# Patient Record
Sex: FEMALE | Race: WHITE | NOT HISPANIC OR LATINO | URBAN - METROPOLITAN AREA
[De-identification: names, ages, dates, MRNs, and addresses within clinical notes are randomized per-mention and may not be internally consistent; named-entity substitution may affect disease eponyms.]

---

## 2018-10-15 ENCOUNTER — OUTPATIENT (OUTPATIENT)
Dept: OUTPATIENT SERVICES | Facility: HOSPITAL | Age: 18
LOS: 1 days | Discharge: HOME | End: 2018-10-15

## 2018-10-15 VITALS — HEART RATE: 86 BPM | DIASTOLIC BLOOD PRESSURE: 77 MMHG | SYSTOLIC BLOOD PRESSURE: 122 MMHG

## 2018-10-15 VITALS
DIASTOLIC BLOOD PRESSURE: 77 MMHG | HEART RATE: 86 BPM | TEMPERATURE: 99 F | SYSTOLIC BLOOD PRESSURE: 122 MMHG | RESPIRATION RATE: 18 BRPM

## 2018-10-15 RX ORDER — ACETAMINOPHEN 500 MG
650 TABLET ORAL ONCE
Qty: 0 | Refills: 0 | Status: COMPLETED | OUTPATIENT
Start: 2018-10-15 | End: 2018-10-15

## 2018-10-15 RX ORDER — SODIUM CHLORIDE 9 MG/ML
1000 INJECTION, SOLUTION INTRAVENOUS
Qty: 0 | Refills: 0 | Status: DISCONTINUED | OUTPATIENT
Start: 2018-10-15 | End: 2018-10-30

## 2018-10-15 NOTE — OB PROVIDER TRIAGE NOTE - NSHPLABSRESULTS_GEN_ALL_CORE
Hep B (03/15/2018): nonreactive  Chlamydia (03/15/2018): neg  Gonorrhea (03/15/2018): neg  Measles (03/15/2018): immune  Varicella (03/15/2018): immune  Rubella (03/15/2018): immune  Mumps (03/15/2018): immune  T&S (03/15/2018): A pos  Antibody screen (03/15/2018): neg  RPR (03/15/2018): neg  Parvo (03/15/2018): immune  GCT: 73  Sono:   11w1d: SVIUP, CRL 11.5w, right ovary 4.2 cm cyst, simple, left ovary normal,   20w2d: AGA 78%, AFL nl, posterior placenta, CL 4.1cm, no anomalies  30w3d: BPP 8/8, MVP 5cm GBS (09/27/2018): pos  HIV (09/27/2018): neg  Hep B (03/15/2018): nonreactive  Chlamydia (03/15/2018): neg  Gonorrhea (03/15/2018): neg  Measles (03/15/2018): immune  Varicella (03/15/2018): immune  Rubella (03/15/2018): immune  Mumps (03/15/2018): immune  T&S (03/15/2018): A pos  Antibody screen (03/15/2018): neg  RPR (03/15/2018): neg  Parvo (03/15/2018): immune  GCT: 73  Sono:   11w1d: SVIUP, CRL 11.5w, right ovary 4.2 cm cyst, simple, left ovary normal,   20w2d: AGA 78%, AFL nl, posterior placenta, CL 4.1cm, no anomalies  30w3d: BPP 8/8, MVP 5cm

## 2018-10-15 NOTE — OB PROVIDER TRIAGE NOTE - NSHPPHYSICALEXAM_GEN_ALL_CORE
Vital Signs Last 24 Hrs  T(C): 37 (15 Oct 2018 21:51), Max: 37 (15 Oct 2018 21:51)  T(F): 98.6 (15 Oct 2018 21:51), Max: 98.6 (15 Oct 2018 21:51)  HR: 86 (15 Oct 2018 21:54) (86 - 86)  BP: 122/77 (15 Oct 2018 21:54) (122/77 - 122/77)  RR: 18 (15 Oct 2018 21:51) (18 - 18)    Udip:   EFM: 135/mod/accel+  [place]: irritability   Abd: NT, gravid, no palpable contractions  EFW by Leopold's: 3700 grams Vital Signs Last 24 Hrs  T(C): 37 (15 Oct 2018 21:51), Max: 37 (15 Oct 2018 21:51)  T(F): 98.6 (15 Oct 2018 21:51), Max: 98.6 (15 Oct 2018 21:51)  HR: 86 (15 Oct 2018 21:54) (86 - 86)  BP: 122/77 (15 Oct 2018 21:54) (122/77 - 122/77)  RR: 18 (15 Oct 2018 21:51) (18 - 18)    EFM: 135/mod/accel+  [place]: irritability   Abd: NT, gravid, no palpable contractions  EFW by Leopold's: 3700 grams Vital Signs Last 24 Hrs  T(C): 37 (15 Oct 2018 21:51), Max: 37 (15 Oct 2018 21:51)  T(F): 98.6 (15 Oct 2018 21:51), Max: 98.6 (15 Oct 2018 21:51)  HR: 86 (15 Oct 2018 21:54) (86 - 86)  BP: 122/77 (15 Oct 2018 21:54) (122/77 - 122/77)  RR: 18 (15 Oct 2018 21:51) (18 - 18)    EFM: 135/mod/accel+  [place]: irritability   Abd: NT, gravid, no palpable contractions, no CVA tenderness  EFW by Leopold's: 3700 grams Vital Signs Last 24 Hrs  T(C): 37 (15 Oct 2018 21:51), Max: 37 (15 Oct 2018 21:51)  T(F): 98.6 (15 Oct 2018 21:51), Max: 98.6 (15 Oct 2018 21:51)  HR: 86 (15 Oct 2018 21:54) (86 - 86)  BP: 122/77 (15 Oct 2018 21:54) (122/77 - 122/77)  RR: 18 (15 Oct 2018 21:51) (18 - 18)    EFM: 135/mod/accel+  [place]: irregular  Abd: NT, gravid, no palpable contractions, no CVA tenderness  EFW by Leopold's: 3700 grams Vital Signs Last 24 Hrs  T(C): 37 (15 Oct 2018 21:51), Max: 37 (15 Oct 2018 21:51)  T(F): 98.6 (15 Oct 2018 21:51), Max: 98.6 (15 Oct 2018 21:51)  HR: 86 (15 Oct 2018 21:54) (86 - 86)  BP: 122/77 (15 Oct 2018 21:54) (122/77 - 122/77)  RR: 18 (15 Oct 2018 21:51) (18 - 18)    Udip: large ketones  EFM: 135/mod/accel+  [place]: irregular  Abd: NT, gravid, no palpable contractions, no CVA tenderness  EFW by Leopold's: 3700 grams Vital Signs Last 24 Hrs  T(C): 37 (15 Oct 2018 21:51), Max: 37 (15 Oct 2018 21:51)  T(F): 98.6 (15 Oct 2018 21:51), Max: 98.6 (15 Oct 2018 21:51)  HR: 86 (15 Oct 2018 21:54) (86 - 86)  BP: 122/77 (15 Oct 2018 21:54) (122/77 - 122/77)  RR: 18 (15 Oct 2018 21:51) (18 - 18)    Udip: large ketones  EFM: 135/mod/accel+  [place]: irregular  Abd: NT, gravid, no palpable contractions, no CVA tenderness  EFW by Leopold's: 3700 grams  Bedside sono: lateral placenta, cephalic, BPP 8/8, MVP 3.11 cm Vital Signs Last 24 Hrs  T(C): 37 (15 Oct 2018 21:51), Max: 37 (15 Oct 2018 21:51)  T(F): 98.6 (15 Oct 2018 21:51), Max: 98.6 (15 Oct 2018 21:51)  HR: 86 (15 Oct 2018 21:54) (86 - 86)  BP: 122/77 (15 Oct 2018 21:54) (122/77 - 122/77)  RR: 18 (15 Oct 2018 21:51) (18 - 18)    Udip: large ketones  EFM: 135/mod/accel+  [place]: irregular  Abd: NT, gravid, no palpable contractions, no CVA tenderness  EFW by Leopold's: 3700 grams  Bedside sono: lateral placenta, cephalic, BPP 8/8, MVP 3.30 cm Vital Signs Last 24 Hrs  T(C): 37 (15 Oct 2018 21:51), Max: 37 (15 Oct 2018 21:51)  T(F): 98.6 (15 Oct 2018 21:51), Max: 98.6 (15 Oct 2018 21:51)  HR: 86 (15 Oct 2018 21:54) (86 - 86)  BP: 122/77 (15 Oct 2018 21:54) (122/77 - 122/77)  RR: 18 (15 Oct 2018 21:51) (18 - 18)    Udip: large ketones  EFM: 135/mod/accel+  [place]: irregular  SVE: C/L/P  Abd: NT, gravid, no palpable contractions, no CVA tenderness  EFW by Leopold's: 3700 grams  Bedside sono: lateral placenta, cephalic, BPP 8/8, MVP 3.30 cm

## 2018-10-15 NOTE — OB PROVIDER TRIAGE NOTE - NSOBPROVIDERNOTE_OBGYN_ALL_OB_FT
19 yo  at 38w1d, GBS pos,     Dr. Dyer and Dr. Lang to be made aware. 19 yo  at 38w1d, GBS pos, for hydration and observation.     -IVF  -CBC, BMP, liver function panel, lipase and amylase  -UA and urine culture  -Continuous EFM/toco  -Vital signs per routine  -Tylenol 650 mg    Dr. Dyer and Dr. Lang aware. 17 yo  at 38w1d, GBS pos, for hydration and observation.     -IVF  -CBC, BMP, liver function panel, lipase and amylase  -UA and urine culture  -Continuous EFM/toco  -Vital signs per routine  -Tylenol 650 mg    Dr. Dyer and Dr. Lang aware.    ADDENDUM  Pt observed, all labs normal except for WBC count. WBC count went down on the second CBC, RUQ US showed nothing significant. Pt given labor precautions, fetal kick counts reinforced. Ambulation and PO hydration encouraged. Pt will be seen in the office on Wednesday (10/17).     Dr. Dyer and Dr. Lang aware. 17 yo  at 38w1d, GBS pos, for hydration and observation.     -IVF  -CBC, BMP, liver function panel, lipase and amylase  -UA and urine culture  -Continuous EFM/toco  -Vital signs per routine  -Tylenol 650 mg    Dr. Dyer and Dr. Lang aware.  Ob Attg- Pt seen and examined at bedside.   Pt is a 17 yo  at 38w1d w/ PAOLA of 10/28/2018 by 1st trimester sonogram presented w/ lower back pain more on the right side that started at 1300, moved to her epigastric area and back to her back, 7-8/10 intensity, constant and changes with positional changes. Tylenol did not help. Denies fever, dysuria, frequency, urgency.  diarrhea x 2 , some nausea but no vomiting. Pain does not change with eating. Also reports feeling shaky in the hands, feeling cold. No contractions, LOF and VB. good FM. Seen in office today and cervix was closed. No complications in this pregnancy, small R ovarain cyst seen early pregnancy , 4cm. On rpt sono at level 2, R ov not seen due to bowel gas.     OB Hx: primigravid  GYN Hx: denies h/o fibroids and STIs, h/o right sided ovarian cyst in this pregnancy   SH: denies tobacco, alcohol and illicit drug use  Med: none  Allergies: NKDA     On exam Vss, afebrile   abd soft nt, gravid term  ext nt  no cvat  Fhr category 1 ,irreg ctx not felt by patient    Imp-Term pregnancy, back pain at edge of ribs posteriorly. No evidence of pyelo, completely benign exam.    ADDENDUM  Pt observed, all labs normal except for WBC count. WBC count went down on the second CBC, RUQ US showed nothing significant. Pt given labor precautions, fetal kick counts reinforced. Ambulation and PO hydration encouraged. Pt will be seen in the office on Wednesday (10/17).     Dr. Dyer and Dr. Lang aware. 17 yo  at 38w1d, GBS pos, for hydration and observation.     -IVF  -CBC, BMP, liver function panel, lipase and amylase  -UA and urine culture  -Continuous EFM/toco  -Vital signs per routine  -Tylenol 650 mg    Dr. Dyer and Dr. Lang aware.    Ob Attg- Pt seen and examined at bedside.   Pt is a 17 yo  at 38w1d w/ PAOLA of 10/28/2018 by 1st trimester sonogram presented w/ lower back pain more on the right side that started at 1300, moved to her epigastric area and back to her back, 7-8/10 intensity, constant and changes with positional changes. Tylenol did not help. Denies fever, dysuria, frequency, urgency.  diarrhea x 2 , some nausea but no vomiting. Pain does not change with eating. Also reports feeling shaky in the hands, feeling cold. No contractions, LOF and VB. good FM. Seen in office today and cervix was closed. No complications in this pregnancy, small R ovarain cyst seen early pregnancy , 4cm. On rpt sono at level 2, R ov not seen due to bowel gas.     OB Hx: primigravid  GYN Hx: denies h/o fibroids and STIs, h/o right sided ovarian cyst in this pregnancy   SH: denies tobacco, alcohol and illicit drug use  Med: none  Allergies: NKDA     On exam Vss, afebrile   abd soft nt, gravid term  ext nt  no cvat  Fhr category 1 ,irreg ctx not felt by patient  labs: 18.7>11.8/36.3, then rpt 16.7>11.3/34.1, abd and Ruq sono nl, u/a +ketones otherwise negative , Cmp Nl, amylase and lipase nl.     Imp-Term pregnancy, back pain at edge of ribs posteriorly. No evidence of pyelo, completely benign exam.  Pt observed, all labs normal except for WBC count. WBC count went down on the second CBC, RUQ US showed nothing significant. On exam mildly tender over ribs posteriorly, picture c/w musculoskeletal pain.   Plan-Precautions rev, if fever, vomiting or worse pain, ret to l&D, labor precautions  will f/u in office 36hrs.     Dr. Dyer and Dr. Lang aware.

## 2018-10-15 NOTE — OB RN PATIENT PROFILE - NS_EDDCALCULATED_OBGYN_ALL_OB
Mom arrived to bedside to visit pt at 2247 with her adult son. Mom updated on pt's status, changes made, and plan of care. All questions answered. Mom held pt's hand and asked appropriate questions. Mom left bedside at 2324 and stated that she was going to try and set up a ride with the medicaid bus service to visit patient. DCFS  name and phone number notepaper given to mom as requested.   First Trimester Sonogram

## 2018-10-15 NOTE — OB PROVIDER TRIAGE NOTE - FAMILY HISTORY
Sibling  Still living? Unknown  Family history of asthma, Age at diagnosis: Age Unknown     Mother  Still living? Unknown  Family history of hypertension, Age at diagnosis: Age Unknown

## 2018-10-15 NOTE — OB PROVIDER TRIAGE NOTE - HISTORY OF PRESENT ILLNESS
19 yo  at 38w1d w/ PAOLA of 10/28/2018 by 1st trimester sonogram here for back pain that started at 1300, moved to her epigastric area and back to her back, 7-8/10 intensity, constant and changes position with positional changes. Tylenol did not help. Also reports feeling shaky in the hands, feeling cold. Denies contractions, LOF and VB. Feels good FM. Last PMD visit was today, VE was done and her cervix was closed. No complications in this pregnancy     OB Hx: primigravid  GYN Hx: denies h/o fibroids, ovarian cysts and STIs  SH: denies tobacco, alcohol and illicit drug use  Med: none  Allergies: NKDA 19 yo  at 38w1d w/ PAOLA of 10/28/2018 by 1st trimester sonogram here for back pain more on the right side that started at 1300, moved to her epigastric area and back to her back, 7-8/10 intensity, constant and changes position with positional changes. Tylenol did not help. Also reports feeling shaky in the hands, feeling cold. Denies contractions, LOF and VB. Feels good FM. Last PMD visit was today, VE was done and her cervix was closed. No complications in this pregnancy     OB Hx: primigravid  GYN Hx: denies h/o fibroids and STIs, h/o right sided ovarian cyst in this pregnancy   SH: denies tobacco, alcohol and illicit drug use  Med: none  Allergies: NKDA 19 yo  at 38w1d w/ PAOLA of 10/28/2018 by 1st trimester sonogram here for back pain more on the right side that started at 1300, moved to her epigastric area and back to her back, 7-8/10 intensity, constant and changes position with positional changes. Tylenol did not help. Denies fever, dysuria, frequency, urgency. Reports two episodes of diarrhea since the morning, some nausea but no vomiting. Pain does not change with eating. Also reports feeling shaky in the hands, feeling cold. Denies contractions, LOF and VB. Feels good FM. Last PMD visit was today, VE was done and her cervix was closed. No complications in this pregnancy     OB Hx: primigravid  GYN Hx: denies h/o fibroids and STIs, h/o right sided ovarian cyst in this pregnancy   SH: denies tobacco, alcohol and illicit drug use  Med: none  Allergies: NKDA 17 yo  at 38w1d w/ PAOLA of 10/28/2018 by 1st trimester sonogram here for back pain more on the right side that started at 1300, moved to her epigastric area and back to her back, 7-8/10 intensity, constant and changes with positional changes. Tylenol did not help. Denies fever, dysuria, frequency, urgency. Reports two episodes of diarrhea since the morning, some nausea but no vomiting. Pain does not change with eating. Also reports feeling shaky in the hands, feeling cold. Denies contractions, LOF and VB. Feels good FM. Last PMD visit was today, VE was done and her cervix was closed. No complications in this pregnancy     OB Hx: primigravid  GYN Hx: denies h/o fibroids and STIs, h/o right sided ovarian cyst in this pregnancy   SH: denies tobacco, alcohol and illicit drug use  Med: none  Allergies: NKDA

## 2018-10-16 ENCOUNTER — OUTPATIENT (OUTPATIENT)
Dept: OUTPATIENT SERVICES | Facility: HOSPITAL | Age: 18
LOS: 1 days | Discharge: HOME | End: 2018-10-16
Payer: MEDICAID

## 2018-10-16 VITALS — TEMPERATURE: 99 F | RESPIRATION RATE: 20 BRPM

## 2018-10-16 VITALS — DIASTOLIC BLOOD PRESSURE: 74 MMHG | HEART RATE: 65 BPM | SYSTOLIC BLOOD PRESSURE: 120 MMHG

## 2018-10-16 LAB
ALBUMIN SERPL ELPH-MCNC: 3.8 G/DL — SIGNIFICANT CHANGE UP (ref 3.5–5.2)
ALBUMIN SERPL ELPH-MCNC: 3.8 G/DL — SIGNIFICANT CHANGE UP (ref 3.5–5.2)
ALP SERPL-CCNC: 154 U/L — HIGH (ref 30–115)
ALP SERPL-CCNC: 155 U/L — HIGH (ref 30–115)
ALT FLD-CCNC: 13 U/L — LOW (ref 14–37)
ALT FLD-CCNC: 14 U/L — SIGNIFICANT CHANGE UP (ref 14–37)
AMYLASE P1 CFR SERPL: 48 U/L — SIGNIFICANT CHANGE UP (ref 25–115)
AMYLASE P1 CFR SERPL: 60 U/L — SIGNIFICANT CHANGE UP (ref 25–115)
ANION GAP SERPL CALC-SCNC: 19 MMOL/L — HIGH (ref 7–14)
ANION GAP SERPL CALC-SCNC: 20 MMOL/L — HIGH (ref 7–14)
APPEARANCE UR: CLEAR — SIGNIFICANT CHANGE UP
APPEARANCE UR: CLEAR — SIGNIFICANT CHANGE UP
AST SERPL-CCNC: 21 U/L — SIGNIFICANT CHANGE UP (ref 14–37)
AST SERPL-CCNC: 25 U/L — SIGNIFICANT CHANGE UP (ref 14–37)
BASOPHILS # BLD AUTO: 0.04 K/UL — SIGNIFICANT CHANGE UP (ref 0–0.2)
BASOPHILS NFR BLD AUTO: 0.2 % — SIGNIFICANT CHANGE UP (ref 0–1)
BILIRUB DIRECT SERPL-MCNC: <0.2 MG/DL — SIGNIFICANT CHANGE UP (ref 0–0.2)
BILIRUB INDIRECT FLD-MCNC: >0.2 MG/DL — SIGNIFICANT CHANGE UP (ref 0.2–1.2)
BILIRUB SERPL-MCNC: 0.4 MG/DL — SIGNIFICANT CHANGE UP (ref 0.2–1.2)
BILIRUB SERPL-MCNC: 0.7 MG/DL — SIGNIFICANT CHANGE UP (ref 0.2–1.2)
BILIRUB UR-MCNC: NEGATIVE — SIGNIFICANT CHANGE UP
BILIRUB UR-MCNC: NEGATIVE — SIGNIFICANT CHANGE UP
BUN SERPL-MCNC: 4 MG/DL — LOW (ref 10–20)
BUN SERPL-MCNC: 4 MG/DL — LOW (ref 10–20)
CALCIUM SERPL-MCNC: 9.2 MG/DL — SIGNIFICANT CHANGE UP (ref 8.5–10.1)
CALCIUM SERPL-MCNC: 9.8 MG/DL — SIGNIFICANT CHANGE UP (ref 8.5–10.1)
CHLORIDE SERPL-SCNC: 101 MMOL/L — SIGNIFICANT CHANGE UP (ref 98–110)
CHLORIDE SERPL-SCNC: 98 MMOL/L — SIGNIFICANT CHANGE UP (ref 98–110)
CO2 SERPL-SCNC: 16 MMOL/L — LOW (ref 17–32)
CO2 SERPL-SCNC: 20 MMOL/L — SIGNIFICANT CHANGE UP (ref 17–32)
COLOR SPEC: YELLOW — SIGNIFICANT CHANGE UP
COLOR SPEC: YELLOW — SIGNIFICANT CHANGE UP
CREAT SERPL-MCNC: 0.5 MG/DL — LOW (ref 0.7–1.5)
CREAT SERPL-MCNC: 0.5 MG/DL — LOW (ref 0.7–1.5)
DIFF PNL FLD: NEGATIVE — SIGNIFICANT CHANGE UP
DIFF PNL FLD: NEGATIVE — SIGNIFICANT CHANGE UP
EOSINOPHIL # BLD AUTO: 0 K/UL — SIGNIFICANT CHANGE UP (ref 0–0.7)
EOSINOPHIL NFR BLD AUTO: 0 % — SIGNIFICANT CHANGE UP (ref 0–8)
GLUCOSE SERPL-MCNC: 79 MG/DL — SIGNIFICANT CHANGE UP (ref 70–99)
GLUCOSE SERPL-MCNC: 90 MG/DL — SIGNIFICANT CHANGE UP (ref 70–99)
GLUCOSE UR QL: NEGATIVE MG/DL — SIGNIFICANT CHANGE UP
GLUCOSE UR QL: NEGATIVE MG/DL — SIGNIFICANT CHANGE UP
HCT VFR BLD CALC: 34.1 % — LOW (ref 37–47)
HCT VFR BLD CALC: 36.2 % — LOW (ref 37–47)
HCT VFR BLD CALC: 36.3 % — LOW (ref 37–47)
HGB BLD-MCNC: 11.3 G/DL — LOW (ref 12–16)
HGB BLD-MCNC: 11.8 G/DL — LOW (ref 12–16)
HGB BLD-MCNC: 11.9 G/DL — LOW (ref 12–16)
IMM GRANULOCYTES NFR BLD AUTO: 0.9 % — HIGH (ref 0.1–0.3)
KETONES UR-MCNC: 15
KETONES UR-MCNC: ABNORMAL
LEUKOCYTE ESTERASE UR-ACNC: NEGATIVE — SIGNIFICANT CHANGE UP
LEUKOCYTE ESTERASE UR-ACNC: NEGATIVE — SIGNIFICANT CHANGE UP
LIDOCAIN IGE QN: 16 U/L — SIGNIFICANT CHANGE UP (ref 7–60)
LIDOCAIN IGE QN: 18 U/L — SIGNIFICANT CHANGE UP (ref 7–60)
LYMPHOCYTES # BLD AUTO: 1.01 K/UL — LOW (ref 1.2–3.4)
LYMPHOCYTES # BLD AUTO: 6.2 % — LOW (ref 20.5–51.1)
MCHC RBC-ENTMCNC: 26.4 PG — LOW (ref 27–31)
MCHC RBC-ENTMCNC: 26.9 PG — LOW (ref 27–31)
MCHC RBC-ENTMCNC: 26.9 PG — LOW (ref 27–31)
MCHC RBC-ENTMCNC: 32.5 G/DL — SIGNIFICANT CHANGE UP (ref 32–37)
MCHC RBC-ENTMCNC: 32.9 G/DL — SIGNIFICANT CHANGE UP (ref 32–37)
MCHC RBC-ENTMCNC: 33.1 G/DL — SIGNIFICANT CHANGE UP (ref 32–37)
MCV RBC AUTO: 81.2 FL — SIGNIFICANT CHANGE UP (ref 81–99)
MCV RBC AUTO: 81.2 FL — SIGNIFICANT CHANGE UP (ref 81–99)
MCV RBC AUTO: 81.9 FL — SIGNIFICANT CHANGE UP (ref 81–99)
MONOCYTES # BLD AUTO: 1.15 K/UL — HIGH (ref 0.1–0.6)
MONOCYTES NFR BLD AUTO: 7 % — SIGNIFICANT CHANGE UP (ref 1.7–9.3)
NEUTROPHILS # BLD AUTO: 14.03 K/UL — HIGH (ref 1.4–6.5)
NEUTROPHILS NFR BLD AUTO: 85.7 % — HIGH (ref 42.2–75.2)
NITRITE UR-MCNC: NEGATIVE — SIGNIFICANT CHANGE UP
NITRITE UR-MCNC: NEGATIVE — SIGNIFICANT CHANGE UP
NRBC # BLD: 0 /100 WBCS — SIGNIFICANT CHANGE UP (ref 0–0)
PH UR: 6 — SIGNIFICANT CHANGE UP (ref 5–8)
PH UR: 7 — SIGNIFICANT CHANGE UP (ref 5–8)
PLATELET # BLD AUTO: 213 K/UL — SIGNIFICANT CHANGE UP (ref 130–400)
PLATELET # BLD AUTO: 232 K/UL — SIGNIFICANT CHANGE UP (ref 130–400)
PLATELET # BLD AUTO: 233 K/UL — SIGNIFICANT CHANGE UP (ref 130–400)
POTASSIUM SERPL-MCNC: 4 MMOL/L — SIGNIFICANT CHANGE UP (ref 3.5–5)
POTASSIUM SERPL-MCNC: 4.4 MMOL/L — SIGNIFICANT CHANGE UP (ref 3.5–5)
POTASSIUM SERPL-SCNC: 4 MMOL/L — SIGNIFICANT CHANGE UP (ref 3.5–5)
POTASSIUM SERPL-SCNC: 4.4 MMOL/L — SIGNIFICANT CHANGE UP (ref 3.5–5)
PROT SERPL-MCNC: 6.5 G/DL — SIGNIFICANT CHANGE UP (ref 6–8)
PROT SERPL-MCNC: 6.6 G/DL — SIGNIFICANT CHANGE UP (ref 6–8)
PROT UR-MCNC: NEGATIVE MG/DL — SIGNIFICANT CHANGE UP
PROT UR-MCNC: NEGATIVE MG/DL — SIGNIFICANT CHANGE UP
RBC # BLD: 4.2 M/UL — SIGNIFICANT CHANGE UP (ref 4.2–5.4)
RBC # BLD: 4.42 M/UL — SIGNIFICANT CHANGE UP (ref 4.2–5.4)
RBC # BLD: 4.47 M/UL — SIGNIFICANT CHANGE UP (ref 4.2–5.4)
RBC # FLD: 13.4 % — SIGNIFICANT CHANGE UP (ref 11.5–14.5)
RBC # FLD: 13.5 % — SIGNIFICANT CHANGE UP (ref 11.5–14.5)
RBC # FLD: 13.7 % — SIGNIFICANT CHANGE UP (ref 11.5–14.5)
SODIUM SERPL-SCNC: 137 MMOL/L — SIGNIFICANT CHANGE UP (ref 135–146)
SODIUM SERPL-SCNC: 137 MMOL/L — SIGNIFICANT CHANGE UP (ref 135–146)
SP GR SPEC: 1.01 — SIGNIFICANT CHANGE UP (ref 1.01–1.03)
SP GR SPEC: <=1.005 — SIGNIFICANT CHANGE UP (ref 1.01–1.03)
UROBILINOGEN FLD QL: 0.2 MG/DL — SIGNIFICANT CHANGE UP (ref 0.2–0.2)
UROBILINOGEN FLD QL: 0.2 MG/DL — SIGNIFICANT CHANGE UP (ref 0.2–0.2)
WBC # BLD: 16.38 K/UL — HIGH (ref 4.8–10.8)
WBC # BLD: 16.68 K/UL — HIGH (ref 4.8–10.8)
WBC # BLD: 18.74 K/UL — HIGH (ref 4.8–10.8)
WBC # FLD AUTO: 16.38 K/UL — HIGH (ref 4.8–10.8)
WBC # FLD AUTO: 16.68 K/UL — HIGH (ref 4.8–10.8)
WBC # FLD AUTO: 18.74 K/UL — HIGH (ref 4.8–10.8)

## 2018-10-16 PROCEDURE — 59025 FETAL NON-STRESS TEST: CPT | Mod: 26

## 2018-10-16 PROCEDURE — 93970 EXTREMITY STUDY: CPT | Mod: 26

## 2018-10-16 PROCEDURE — 99221 1ST HOSP IP/OBS SF/LOW 40: CPT | Mod: 25

## 2018-10-16 RX ORDER — OXYCODONE AND ACETAMINOPHEN 5; 325 MG/1; MG/1
1 TABLET ORAL ONCE
Qty: 0 | Refills: 0 | Status: DISCONTINUED | OUTPATIENT
Start: 2018-10-16 | End: 2018-10-16

## 2018-10-16 RX ORDER — FAMOTIDINE 10 MG/ML
20 INJECTION INTRAVENOUS ONCE
Qty: 0 | Refills: 0 | Status: DISCONTINUED | OUTPATIENT
Start: 2018-10-16 | End: 2018-10-30

## 2018-10-16 RX ORDER — CYCLOBENZAPRINE HYDROCHLORIDE 10 MG/1
10 TABLET, FILM COATED ORAL ONCE
Qty: 0 | Refills: 0 | Status: COMPLETED | OUTPATIENT
Start: 2018-10-16 | End: 2018-10-16

## 2018-10-16 RX ORDER — CYCLOBENZAPRINE HYDROCHLORIDE 10 MG/1
1 TABLET, FILM COATED ORAL
Qty: 20 | Refills: 0 | OUTPATIENT
Start: 2018-10-16 | End: 2018-10-25

## 2018-10-16 RX ADMIN — OXYCODONE AND ACETAMINOPHEN 1 TABLET(S): 5; 325 TABLET ORAL at 18:05

## 2018-10-16 RX ADMIN — Medication 650 MILLIGRAM(S): at 01:34

## 2018-10-16 RX ADMIN — CYCLOBENZAPRINE HYDROCHLORIDE 10 MILLIGRAM(S): 10 TABLET, FILM COATED ORAL at 18:05

## 2018-10-16 NOTE — OB PROVIDER TRIAGE NOTE - NSHPPHYSICALEXAM_GEN_ALL_CORE
Vital Signs Last 24 Hrs  T(C): 37.2 (16 Oct 2018 12:56), Max: 37.2 (16 Oct 2018 12:56)  T(F): 99 (16 Oct 2018 12:56), Max: 99 (16 Oct 2018 12:56)  HR: 68 (16 Oct 2018 13:48) (59 - 99)  BP: 120/74 (16 Oct 2018 12:56) (120/74 - 122/77)  RR: 18 (16 Oct 2018 12:56) (18 - 18)  SpO2: 99% (16 Oct 2018 13:48) (98% - 100%)    GEN: NAD, AAOX3  abd: soft, gravid, no palpable ctx, no abdominal or CVA tenderness, no tenderness upon palpation of right back or right side  EFM:  toco:  SVE: Vital Signs Last 24 Hrs  T(C): 37.2 (16 Oct 2018 12:56), Max: 37.2 (16 Oct 2018 12:56)  T(F): 99 (16 Oct 2018 12:56), Max: 99 (16 Oct 2018 12:56)  HR: 68 (16 Oct 2018 13:48) (59 - 99)  BP: 120/74 (16 Oct 2018 12:56) (120/74 - 122/77)  RR: 18 (16 Oct 2018 12:56) (18 - 18)  SpO2: 99% (16 Oct 2018 13:48) (98% - 100%)    GEN: NAD, AAOX3  lungs: CTAB  CVS: RRR, normal S1/S2  abd: soft, gravid, no palpable ctx, no abdominal or CVA tenderness, no tenderness upon palpation of right back or right side  EFM: 140/mod/accels+  toco: no ctx  SVE: deferred  sonogram: deferred, last night BPP 8/8, cephalic, lateral placenta

## 2018-10-16 NOTE — PROGRESS NOTE ADULT - SUBJECTIVE AND OBJECTIVE BOX
PGY3 Note    Pt seen at bedside, evaluated for musculoskeletal pain. Pain is in the back, worse with extension, right lateral flexion. Pain is reproducible with palpation along the 7th right rib, on the posterior, lateral, and anterior side of the chest. Plan to give one dose of flexeril and percocet and reevaluate, if better, will likely d/c home with flexeril and percocet.    Dr. Dyer aware and at bedside

## 2018-10-16 NOTE — OB PROVIDER TRIAGE NOTE - NSHPLABSRESULTS_GEN_ALL_CORE
GBS (09/27/2018): pos  	HIV (09/27/2018): neg  	Hep B (03/15/2018): nonreactive  	Chlamydia (03/15/2018): neg  	Gonorrhea (03/15/2018): neg  	Measles (03/15/2018): immune  	Varicella (03/15/2018): immune  	Rubella (03/15/2018): immune  	Mumps (03/15/2018): immune  	T&S (03/15/2018): A pos  	Antibody screen (03/15/2018): neg  	RPR (03/15/2018): neg  	Parvo (03/15/2018): immune  	GCT: 73  	Sono:   	11w1d: SVIUP, CRL 11.5w, right ovary 4.2 cm cyst, simple, left ovary normal,   	20w2d: AGA 78%, AFL nl, posterior placenta, CL 4.1cm, no anomalies  30w3d: BPP 8/8, MVP 5cm

## 2018-10-16 NOTE — PROGRESS NOTE ADULT - SUBJECTIVE AND OBJECTIVE BOX
PGY2 progress note    Patient seen and evaluated at bedside. Reports pain now improved after percocet and flexeril. Ambulating with no difficulty. Denies contractions, vaginal bleeding and LOF. Feels good fetal movements.     PE:  Vital Signs Last 24 Hrs  T(C): 37.2 (16 Oct 2018 12:56), Max: 37.2 (16 Oct 2018 12:56)  T(F): 99 (16 Oct 2018 12:56), Max: 99 (16 Oct 2018 12:56)  HR: 86 (16 Oct 2018 13:58) (59 - 99)  BP: 120/74 (16 Oct 2018 12:56) (120/74 - 122/77)  RR: 18 (16 Oct 2018 12:56) (18 - 18)  SpO2: 99% (16 Oct 2018 13:58) (98% - 100%)    labs:                        11.9   16.38 )-----------( 233      ( 16 Oct 2018 16:30 )             36.2   10-16    137  |  98  |  4<L>  ----------------------------<  79  4.0   |  20  |  0.5<L>    Ca    9.2      16 Oct 2018 16:30    TPro  6.6  /  Alb  3.8  /  TBili  0.7  /  DBili  x   /  AST  25  /  ALT  14  /  AlkPhos  154<H>  10-16    amylase 48, lipase 16    Urinalysis Basic - ( 16 Oct 2018 17:00 )    Color: Yellow / Appearance: Clear / S.010 / pH: x  Gluc: x / Ketone: Trace  / Bili: Negative / Urobili: 0.2 mg/dL   Blood: x / Protein: Negative mg/dL / Nitrite: Negative   Leuk Esterase: Negative / RBC: x / WBC x   Sq Epi: x / Non Sq Epi: x / Bacteria: x    Imaging:  EKG: sinus bradycardia, HR 59 bpm  CXR (prelim read Dr. Feng): normal  lower extremity doppler (prelim): negative

## 2018-10-16 NOTE — OB PROVIDER TRIAGE NOTE - HISTORY OF PRESENT ILLNESS
19 y/o  at 38w2d dated by first trimester sonogram, presents for right upper back/right side pain. Patient was evaluated for same pain yesterday in L&D. Had RUQ sonogram that was unremarkable with no evidence of cholelithiasis/cholecystitis or right hydronephrosis/nephrolithiasis. Discharged home with precautions. Patient presents again today for worsening of pain, now 8/10, constant, located to right upper back and right side, sharp in nature, nonradiating, worse when taking a deep breath, not related to position or food. Denies fever, n/v, chest pain, SOB, abdominal pain, dysuria, hematuria. Had 1 loose stool yesterday. Denies contractions, vaginal bleeding and LOF. Feels good fetal movements. No complications this pregnancy. Last examined yesterday and was found to be closed. GBS positive.    OB Hx: primigravida  Gyn Hx: H/o right ovarian cyst this pregnancy, no intervention. Denies fibroids, abnormal PAP smears and STDs.

## 2018-10-16 NOTE — PROGRESS NOTE ADULT - ASSESSMENT
19 y/o  at 38w2d GA, GBS positive, with likely musculoskeletal pain now improved after percocet and flexeril, no evidence of cardiac or pulmonary pathology.   - discharge home  - flexeril PRN for pain  - f/u with PMD this week  - labor precautions given    Dr. Gustafson and Dr. Vane Gasca aware.

## 2018-10-16 NOTE — OB PROVIDER TRIAGE NOTE - FAMILY HISTORY
Sibling  Still living? Unknown  Family history of asthma, Age at diagnosis: Age Unknown  Family history of non-Hodgkin's lymphoma, Age at diagnosis: Age Unknown     Mother  Still living? Unknown  Family history of hypertension, Age at diagnosis: Age Unknown

## 2018-10-17 LAB
CULTURE RESULTS: NO GROWTH — SIGNIFICANT CHANGE UP
SPECIMEN SOURCE: SIGNIFICANT CHANGE UP

## 2018-10-17 PROCEDURE — 59025 FETAL NON-STRESS TEST: CPT | Mod: 26

## 2018-10-17 PROCEDURE — 99232 SBSQ HOSP IP/OBS MODERATE 35: CPT | Mod: 25

## 2018-10-28 ENCOUNTER — INPATIENT (INPATIENT)
Facility: HOSPITAL | Age: 18
LOS: 1 days | Discharge: HOME | End: 2018-10-30
Attending: OBSTETRICS & GYNECOLOGY | Admitting: OBSTETRICS & GYNECOLOGY
Payer: MEDICAID

## 2018-10-28 VITALS — TEMPERATURE: 99 F | DIASTOLIC BLOOD PRESSURE: 83 MMHG | SYSTOLIC BLOOD PRESSURE: 130 MMHG

## 2018-10-28 LAB
APPEARANCE UR: CLEAR — SIGNIFICANT CHANGE UP
BASOPHILS # BLD AUTO: 0.05 K/UL — SIGNIFICANT CHANGE UP (ref 0–0.2)
BASOPHILS NFR BLD AUTO: 0.4 % — SIGNIFICANT CHANGE UP (ref 0–1)
BILIRUB UR-MCNC: NEGATIVE — SIGNIFICANT CHANGE UP
BLD GP AB SCN SERPL QL: SIGNIFICANT CHANGE UP
COLOR SPEC: YELLOW — SIGNIFICANT CHANGE UP
DIFF PNL FLD: NEGATIVE — SIGNIFICANT CHANGE UP
EOSINOPHIL # BLD AUTO: 0.14 K/UL — SIGNIFICANT CHANGE UP (ref 0–0.7)
EOSINOPHIL NFR BLD AUTO: 1.1 % — SIGNIFICANT CHANGE UP (ref 0–8)
GLUCOSE UR QL: NEGATIVE MG/DL — SIGNIFICANT CHANGE UP
HCT VFR BLD CALC: 38.3 % — SIGNIFICANT CHANGE UP (ref 37–47)
HGB BLD-MCNC: 12.6 G/DL — SIGNIFICANT CHANGE UP (ref 12–16)
IMM GRANULOCYTES NFR BLD AUTO: 1.2 % — HIGH (ref 0.1–0.3)
KETONES UR-MCNC: NEGATIVE — SIGNIFICANT CHANGE UP
LEUKOCYTE ESTERASE UR-ACNC: NEGATIVE — SIGNIFICANT CHANGE UP
LYMPHOCYTES # BLD AUTO: 15.3 % — LOW (ref 20.5–51.1)
LYMPHOCYTES # BLD AUTO: 2 K/UL — SIGNIFICANT CHANGE UP (ref 1.2–3.4)
MCHC RBC-ENTMCNC: 26.6 PG — LOW (ref 27–31)
MCHC RBC-ENTMCNC: 32.9 G/DL — SIGNIFICANT CHANGE UP (ref 32–37)
MCV RBC AUTO: 81 FL — SIGNIFICANT CHANGE UP (ref 81–99)
MONOCYTES # BLD AUTO: 1.19 K/UL — HIGH (ref 0.1–0.6)
MONOCYTES NFR BLD AUTO: 9.1 % — SIGNIFICANT CHANGE UP (ref 1.7–9.3)
NEUTROPHILS # BLD AUTO: 9.57 K/UL — HIGH (ref 1.4–6.5)
NEUTROPHILS NFR BLD AUTO: 72.9 % — SIGNIFICANT CHANGE UP (ref 42.2–75.2)
NITRITE UR-MCNC: NEGATIVE — SIGNIFICANT CHANGE UP
NRBC # BLD: 0 /100 WBCS — SIGNIFICANT CHANGE UP (ref 0–0)
PH UR: 6.5 — SIGNIFICANT CHANGE UP (ref 5–8)
PLATELET # BLD AUTO: 261 K/UL — SIGNIFICANT CHANGE UP (ref 130–400)
PRENATAL SYPHILIS TEST: SIGNIFICANT CHANGE UP
PROT UR-MCNC: NEGATIVE MG/DL — SIGNIFICANT CHANGE UP
RBC # BLD: 4.73 M/UL — SIGNIFICANT CHANGE UP (ref 4.2–5.4)
RBC # FLD: 13.9 % — SIGNIFICANT CHANGE UP (ref 11.5–14.5)
SP GR SPEC: 1.01 — SIGNIFICANT CHANGE UP (ref 1.01–1.03)
TYPE + AB SCN PNL BLD: SIGNIFICANT CHANGE UP
UROBILINOGEN FLD QL: 0.2 MG/DL — SIGNIFICANT CHANGE UP (ref 0.2–0.2)
WBC # BLD: 13.11 K/UL — HIGH (ref 4.8–10.8)
WBC # FLD AUTO: 13.11 K/UL — HIGH (ref 4.8–10.8)

## 2018-10-28 PROCEDURE — 59400 OBSTETRICAL CARE: CPT | Mod: U9

## 2018-10-28 RX ORDER — HYDROCORTISONE 1 %
1 OINTMENT (GRAM) TOPICAL EVERY 4 HOURS
Qty: 0 | Refills: 0 | Status: DISCONTINUED | OUTPATIENT
Start: 2018-10-28 | End: 2018-10-30

## 2018-10-28 RX ORDER — LANOLIN
1 OINTMENT (GRAM) TOPICAL EVERY 6 HOURS
Qty: 0 | Refills: 0 | Status: DISCONTINUED | OUTPATIENT
Start: 2018-10-28 | End: 2018-10-30

## 2018-10-28 RX ORDER — GLYCERIN ADULT
1 SUPPOSITORY, RECTAL RECTAL AT BEDTIME
Qty: 0 | Refills: 0 | Status: DISCONTINUED | OUTPATIENT
Start: 2018-10-28 | End: 2018-10-30

## 2018-10-28 RX ORDER — AMPICILLIN TRIHYDRATE 250 MG
2 CAPSULE ORAL ONCE
Qty: 0 | Refills: 0 | Status: COMPLETED | OUTPATIENT
Start: 2018-10-28 | End: 2018-10-28

## 2018-10-28 RX ORDER — ZOLPIDEM TARTRATE 10 MG/1
5 TABLET ORAL AT BEDTIME
Qty: 0 | Refills: 0 | Status: DISCONTINUED | OUTPATIENT
Start: 2018-10-28 | End: 2018-10-30

## 2018-10-28 RX ORDER — DIPHENHYDRAMINE HCL 50 MG
25 CAPSULE ORAL EVERY 6 HOURS
Qty: 0 | Refills: 0 | Status: DISCONTINUED | OUTPATIENT
Start: 2018-10-28 | End: 2018-10-30

## 2018-10-28 RX ORDER — OXYCODONE AND ACETAMINOPHEN 5; 325 MG/1; MG/1
1 TABLET ORAL EVERY 6 HOURS
Qty: 0 | Refills: 0 | Status: DISCONTINUED | OUTPATIENT
Start: 2018-10-28 | End: 2018-10-30

## 2018-10-28 RX ORDER — DOCUSATE SODIUM 100 MG
100 CAPSULE ORAL
Qty: 0 | Refills: 0 | Status: DISCONTINUED | OUTPATIENT
Start: 2018-10-28 | End: 2018-10-30

## 2018-10-28 RX ORDER — DIPHENHYDRAMINE HCL 50 MG
0.25 CAPSULE ORAL ONCE
Qty: 0 | Refills: 0 | Status: COMPLETED | OUTPATIENT
Start: 2018-10-28 | End: 2018-10-28

## 2018-10-28 RX ORDER — SODIUM CHLORIDE 9 MG/ML
3 INJECTION INTRAMUSCULAR; INTRAVENOUS; SUBCUTANEOUS EVERY 8 HOURS
Qty: 0 | Refills: 0 | Status: DISCONTINUED | OUTPATIENT
Start: 2018-10-28 | End: 2018-10-30

## 2018-10-28 RX ORDER — SIMETHICONE 80 MG/1
80 TABLET, CHEWABLE ORAL EVERY 6 HOURS
Qty: 0 | Refills: 0 | Status: DISCONTINUED | OUTPATIENT
Start: 2018-10-28 | End: 2018-10-30

## 2018-10-28 RX ORDER — OXYTOCIN 10 UNIT/ML
41.67 VIAL (ML) INJECTION
Qty: 20 | Refills: 0 | Status: DISCONTINUED | OUTPATIENT
Start: 2018-10-28 | End: 2018-10-30

## 2018-10-28 RX ORDER — AER TRAVELER 0.5 G/1
1 SOLUTION RECTAL; TOPICAL EVERY 4 HOURS
Qty: 0 | Refills: 0 | Status: DISCONTINUED | OUTPATIENT
Start: 2018-10-28 | End: 2018-10-30

## 2018-10-28 RX ORDER — DIBUCAINE 1 %
1 OINTMENT (GRAM) RECTAL EVERY 4 HOURS
Qty: 0 | Refills: 0 | Status: DISCONTINUED | OUTPATIENT
Start: 2018-10-28 | End: 2018-10-30

## 2018-10-28 RX ORDER — IBUPROFEN 200 MG
600 TABLET ORAL EVERY 6 HOURS
Qty: 0 | Refills: 0 | Status: DISCONTINUED | OUTPATIENT
Start: 2018-10-28 | End: 2018-10-30

## 2018-10-28 RX ORDER — ACETAMINOPHEN 500 MG
650 TABLET ORAL EVERY 6 HOURS
Qty: 0 | Refills: 0 | Status: DISCONTINUED | OUTPATIENT
Start: 2018-10-28 | End: 2018-10-30

## 2018-10-28 RX ORDER — AMPICILLIN TRIHYDRATE 250 MG
1 CAPSULE ORAL EVERY 4 HOURS
Qty: 0 | Refills: 0 | Status: DISCONTINUED | OUTPATIENT
Start: 2018-10-28 | End: 2018-10-28

## 2018-10-28 RX ORDER — MAGNESIUM HYDROXIDE 400 MG/1
30 TABLET, CHEWABLE ORAL
Qty: 0 | Refills: 0 | Status: DISCONTINUED | OUTPATIENT
Start: 2018-10-28 | End: 2018-10-30

## 2018-10-28 RX ORDER — BUTORPHANOL TARTRATE 2 MG/ML
2 INJECTION, SOLUTION INTRAMUSCULAR; INTRAVENOUS ONCE
Qty: 0 | Refills: 0 | Status: DISCONTINUED | OUTPATIENT
Start: 2018-10-28 | End: 2018-10-28

## 2018-10-28 RX ORDER — SODIUM CHLORIDE 9 MG/ML
1000 INJECTION, SOLUTION INTRAVENOUS
Qty: 0 | Refills: 0 | Status: DISCONTINUED | OUTPATIENT
Start: 2018-10-28 | End: 2018-10-28

## 2018-10-28 RX ORDER — OXYTOCIN 10 UNIT/ML
41.67 VIAL (ML) INJECTION
Qty: 20 | Refills: 0 | Status: DISCONTINUED | OUTPATIENT
Start: 2018-10-28 | End: 2018-10-28

## 2018-10-28 RX ORDER — AMPICILLIN TRIHYDRATE 250 MG
CAPSULE ORAL
Qty: 0 | Refills: 0 | Status: DISCONTINUED | OUTPATIENT
Start: 2018-10-28 | End: 2018-10-28

## 2018-10-28 RX ORDER — SODIUM CHLORIDE 9 MG/ML
1000 INJECTION, SOLUTION INTRAVENOUS ONCE
Qty: 0 | Refills: 0 | Status: DISCONTINUED | OUTPATIENT
Start: 2018-10-28 | End: 2018-10-28

## 2018-10-28 RX ORDER — PRAMOXINE HYDROCHLORIDE 150 MG/15G
1 AEROSOL, FOAM RECTAL EVERY 4 HOURS
Qty: 0 | Refills: 0 | Status: DISCONTINUED | OUTPATIENT
Start: 2018-10-28 | End: 2018-10-30

## 2018-10-28 RX ADMIN — Medication 600 MILLIGRAM(S): at 23:20

## 2018-10-28 RX ADMIN — SODIUM CHLORIDE 3 MILLILITER(S): 9 INJECTION INTRAMUSCULAR; INTRAVENOUS; SUBCUTANEOUS at 14:22

## 2018-10-28 RX ADMIN — Medication 108 GRAM(S): at 07:30

## 2018-10-28 RX ADMIN — BUTORPHANOL TARTRATE 2 MILLIGRAM(S): 2 INJECTION, SOLUTION INTRAMUSCULAR; INTRAVENOUS at 03:30

## 2018-10-28 RX ADMIN — Medication 116 GRAM(S): at 03:30

## 2018-10-28 RX ADMIN — Medication 600 MILLIGRAM(S): at 16:32

## 2018-10-28 RX ADMIN — SODIUM CHLORIDE 3 MILLILITER(S): 9 INJECTION INTRAMUSCULAR; INTRAVENOUS; SUBCUTANEOUS at 22:46

## 2018-10-28 RX ADMIN — Medication 125 MILLIUNIT(S)/MIN: at 10:37

## 2018-10-28 RX ADMIN — Medication 100 MILLIGRAM(S): at 03:30

## 2018-10-28 RX ADMIN — ZOLPIDEM TARTRATE 5 MILLIGRAM(S): 10 TABLET ORAL at 23:50

## 2018-10-28 RX ADMIN — Medication 600 MILLIGRAM(S): at 10:39

## 2018-10-28 RX ADMIN — Medication 600 MILLIGRAM(S): at 22:46

## 2018-10-28 NOTE — PROGRESS NOTE ADULT - SUBJECTIVE AND OBJECTIVE BOX
PGY III Note    Patient seen at bedside for evaluation of labor progression.   s/p epidural.    T(F): 97.88 (07:15)  HR: 112 (07:37)  BP: 118/76 (07:37)  RR: 18 (07:15)  ICU Vital Signs Last 24 Hrs  T(C): 36.6 (28 Oct 2018 07:15), Max: 37.2 (28 Oct 2018 02:09)  T(F): 97.88 (28 Oct 2018 07:15), Max: 99 (28 Oct 2018 02:14)  HR: 112 (28 Oct 2018 07:37) (67 - 112)  BP: 118/76 (28 Oct 2018 07:37) (112/74 - 136/93)  BP(mean): --  ABP: --  ABP(mean): --  RR: 18 (28 Oct 2018 07:15) (18 - 20)  SpO2: --        EFM: 130/mod genet/+accels  TOCO: none	  SVE: 5/90/-2, AROM clear    Labs:                        12.6   13.11 )-----------( 261      ( 28 Oct 2018 04:10 )             38.3             Urinalysis Basic - ( 28 Oct 2018 04:10 )    Color: Yellow / Appearance: Clear / S.010 / pH: x  Gluc: x / Ketone: Negative  / Bili: Negative / Urobili: 0.2 mg/dL   Blood: x / Protein: Negative mg/dL / Nitrite: Negative   Leuk Esterase: Negative / RBC: x / WBC x   Sq Epi: x / Non Sq Epi: x / Bacteria: x    A pos  RPR NR      Meds: ampicillin  IVPB 1 Gram(s) IV Intermittent every 4 hours  ampicillin  IVPB      lactated ringers Bolus 1000 milliLiter(s) IV Bolus once  lactated ringers. 1000 milliLiter(s) IV Continuous <Continuous>  s/p epidural      A/P:  18y  @ 40w, GBS pos, in labor, s/p epidural, s/p AROM    - continue current management    Dr. Cifuentes to be made aware

## 2018-10-28 NOTE — PROGRESS NOTE ADULT - SUBJECTIVE AND OBJECTIVE BOX
PGY1 Note    Patient seen at bedside for evaluation. She reports relief with stadol but contraction pain started again shortly after. She desires epidural.    T(F): 98.9 (10-28 @ 03:18), Max: 99 (10-28 @ 02:14)  HR: 86 (10-28 @ 05:25)  BP: 119/77 (10-28 @ 05:25) (116/79 - 136/93)  RR: 20 (10-28 @ 03:18)  EFM: 130/mod/+accels  TOCO: q 1-3min  SVE: 2-3/80/-1 intact, vertex    Medications:    ampicillin  IVPB: 116 (10-28 @ 03:30)  butorphanol Injectable: 2 (10-28 @ 03:30)  diphenhydrAMINE  IVPB: 100 (10-28 @ 03:30)      Labs:                        12.6   13.11 )-----------( 261      ( 28 Oct 2018 04:10 )             38.3           Type + Screen: A POS (10-28-18 @ 04:10)  Antibody Screen: NEG (10-28-18 @ 04:10)    Urinalysis Basic - ( 28 Oct 2018 04:10 )    Color: Yellow / Appearance: Clear / S.010 / pH: x  Gluc: x / Ketone: Negative  / Bili: Negative / Urobili: 0.2 mg/dL   Blood: x / Protein: Negative mg/dL / Nitrite: Negative   Leuk Esterase: Negative / RBC: x / WBC x   Sq Epi: x / Non Sq Epi: x / Bacteria: x

## 2018-10-28 NOTE — OB PROVIDER TRIAGE NOTE - HISTORY OF PRESENT ILLNESS
17y  at 40w by first trimester sono, here with contractions that started at 1500pm irregular but since 0000 they've been progressively increasing in intensity and frequency since 0000. They occur q 2-3min and are 8/10 in intensity. Patient reports leakage of fluid that soaked her undergarments once and it was clear. She denies vaginal bleeding and reports good fetal movement. She denies any complications in this pregnancy. GBS + 17y  at 40w by first trimester sono, here with contractions that started at 1500pm irregular but since 0000 they've been progressively increasing in intensity and frequency since 0000. They occur q 2-3min and are 8/10 in intensity. Patient reports leakage of fluid that soaked her undergarments once and it was clear. She denies vaginal bleeding and reports good fetal movement. She denies any complications in this pregnancy. GBS pos

## 2018-10-28 NOTE — OB PROVIDER TRIAGE NOTE - NSHPLABSRESULTS_GEN_ALL_CORE
GCT 73  Sonogram  20.2w AGA 78%, AFV nl, post placenta, cvx 4.1cm, no anomalies  30.3 In office BPP 8/8, MVP 5cm

## 2018-10-28 NOTE — PROCEDURE NOTE - NSLOADDOSE_OBGYN_ALL_OB
fentanyl 250 mcg added to infusion above/10 ML of 0.25 percent Bupivicaine/Continuous Bupivicaine 0.1 percent/100 Micrograms Fentanyl

## 2018-10-28 NOTE — OB PROVIDER H&P - ASSESSMENT
17y  at 40w by first trimester sono, GBS pos, in early labor for pain management  -Admit to labor and delivery -IV hydration and clear liquid diet -Continuous efm and toco -Stadol/ Benadryl for pain management -Reevaluate  Dr. Lambert and Dr. Cifuentes aware

## 2018-10-28 NOTE — OB PROVIDER TRIAGE NOTE - NSHPPHYSICALEXAM_GEN_ALL_CORE
Physical exam:    Vital Signs Last 24 Hrs  T(F): 98.78 (28 Oct 2018 02:29), Max: 99 (28 Oct 2018 02:14)  HR: 78 (28 Oct 2018 02:56) (78 - 82)  BP: 136/93 (28 Oct 2018 02:56) (130/83 - 136/93)  RR: 20 (28 Oct 2018 02:29) (20 - 20)  udip: neg  Gen: NAD  Abdomen: Soft, nontender, gravid. EFW: 3100g  Ext: No calf tenderness  VE: 2/80/-2  Speculum: neg pooling, no bleeding, nitrazine neg, ferning pending  EFM: 120/mod/+accels  toco: q 1-4min

## 2018-10-28 NOTE — OB PROVIDER DELIVERY SUMMARY - NSPROVIDERDELIVERYNOTE_OBGYN_ALL_OB_FT
Patient fully dilated, OA, pushed to deliver viable female .  Apgar 9/9.  Placenta intact with 3vc.  Cervix, vagina intact.  Median episiotomy repaired with 2-0/3-0 chromic.   cc.  Tolerated well.  Infant to wbn.  Mother received GBS prophylaxis.

## 2018-10-28 NOTE — OB PROVIDER H&P - NS_OBGYNHISTORY_OBGYN_ALL_OB_FT
Obhx: Primigravid    Gynhx: hx of ovarian cysts discovered in this pregnancy(conservatively managed) Denies fibroids, STI's or abnormal pap smear in this pregnancy

## 2018-10-28 NOTE — PROGRESS NOTE ADULT - ASSESSMENT
17y  at 40w by first trimester sono, GBS pos, in early labor    -IV hydration and clear liquid diet  -Continuous efm and toco  -Epidural for pain management  -Monitor vitals  -f/u pending admission labs (UDS)  -Reevaluate      Dr. Lambert and Dr. Cifuentes aware

## 2018-10-28 NOTE — OB PROVIDER TRIAGE NOTE - NSOBPROVIDERNOTE_OBGYN_ALL_OB_FT
17y  at 40w by first trimester sono, GBS pos, in early labor for pain management    -Admit to labor and delivery  -IV hydration and clear liquid diet  -Continuous efm and toco  -Pain management PRN  -Reevaluate    Dr. Lambert and Dr. Cifuentes aware

## 2018-10-28 NOTE — PROCEDURE NOTE - PROCEDURE
<<-----Click on this checkbox to enter Procedure Epidural anesthesia  10/28/2018    Active  [id number]

## 2018-10-28 NOTE — OB PROVIDER TRIAGE NOTE - NS_OBGYNHISTORY_OBGYN_ALL_OB_FT
Obhx: Primigravid    Gynhx: hx of ovarian cysts discovered in this pregnancy(conservatively managed) Denies fibroids, STI's or abnormal pap smear in this pregnany Obhx: Primigravid    Gynhx: hx of ovarian cysts discovered in this pregnancy(conservatively managed) Denies fibroids, STI's or abnormal pap smear in this pregnancy

## 2018-10-28 NOTE — OB PROVIDER H&P - HISTORY OF PRESENT ILLNESS
17y  at 40w by first trimester sono, here with contractions that started at 1500pm irregular but since 0000 they've been progressively increasing in intensity and frequency since 0000. They occur q 2-3min and are 8/10 in intensity. Patient reports leakage of fluid that soaked her undergarments once and it was clear. She denies vaginal bleeding and reports good fetal movement. She denies any complications in this pregnancy. GBS pos

## 2018-10-28 NOTE — OB PROVIDER H&P - ATTENDING COMMENTS
18 Y/O  AT 40 WKS W/C/O IRREG CTX, +FM, NO ROM, NO BLEEDING.    PNC: FOLLOWED DURING PREGNANCY FOR RIB PAIN OF UNKNOWN ETIOLOGY, WORK UP WAS NEGATIVE, SPONTANEOUSLY RESOLVED  GBS +    NO MED HX  CX: 2-3/90/-1/I/ADEQ ON ADMISSION  NST: REACTIVE  TOCO: IRREG CTX  ADMIT, INITIALLY, RECEIVED STADOL, THEN EPIDURAL, ABX, AROM, ANTICIPATE NSD

## 2018-10-29 LAB
BASOPHILS # BLD AUTO: 0.06 K/UL — SIGNIFICANT CHANGE UP (ref 0–0.2)
BASOPHILS NFR BLD AUTO: 0.4 % — SIGNIFICANT CHANGE UP (ref 0–1)
EOSINOPHIL # BLD AUTO: 0.22 K/UL — SIGNIFICANT CHANGE UP (ref 0–0.7)
EOSINOPHIL NFR BLD AUTO: 1.6 % — SIGNIFICANT CHANGE UP (ref 0–8)
HCT VFR BLD CALC: 34.4 % — LOW (ref 37–47)
HGB BLD-MCNC: 11 G/DL — LOW (ref 12–16)
IMM GRANULOCYTES NFR BLD AUTO: 0.9 % — HIGH (ref 0.1–0.3)
LYMPHOCYTES # BLD AUTO: 17.6 % — LOW (ref 20.5–51.1)
LYMPHOCYTES # BLD AUTO: 2.44 K/UL — SIGNIFICANT CHANGE UP (ref 1.2–3.4)
MCHC RBC-ENTMCNC: 26.2 PG — LOW (ref 27–31)
MCHC RBC-ENTMCNC: 32 G/DL — SIGNIFICANT CHANGE UP (ref 32–37)
MCV RBC AUTO: 81.9 FL — SIGNIFICANT CHANGE UP (ref 81–99)
MONOCYTES # BLD AUTO: 0.91 K/UL — HIGH (ref 0.1–0.6)
MONOCYTES NFR BLD AUTO: 6.6 % — SIGNIFICANT CHANGE UP (ref 1.7–9.3)
NEUTROPHILS # BLD AUTO: 10.1 K/UL — HIGH (ref 1.4–6.5)
NEUTROPHILS NFR BLD AUTO: 72.9 % — SIGNIFICANT CHANGE UP (ref 42.2–75.2)
NRBC # BLD: 0 /100 WBCS — SIGNIFICANT CHANGE UP (ref 0–0)
PLATELET # BLD AUTO: 208 K/UL — SIGNIFICANT CHANGE UP (ref 130–400)
RBC # BLD: 4.2 M/UL — SIGNIFICANT CHANGE UP (ref 4.2–5.4)
RBC # FLD: 14.2 % — SIGNIFICANT CHANGE UP (ref 11.5–14.5)
WBC # BLD: 13.86 K/UL — HIGH (ref 4.8–10.8)
WBC # FLD AUTO: 13.86 K/UL — HIGH (ref 4.8–10.8)

## 2018-10-29 RX ORDER — INFLUENZA VIRUS VACCINE 15; 15; 15; 15 UG/.5ML; UG/.5ML; UG/.5ML; UG/.5ML
0.5 SUSPENSION INTRAMUSCULAR ONCE
Qty: 0 | Refills: 0 | Status: COMPLETED | OUTPATIENT
Start: 2018-10-29 | End: 2018-10-29

## 2018-10-29 RX ADMIN — INFLUENZA VIRUS VACCINE 0.5 MILLILITER(S): 15; 15; 15; 15 SUSPENSION INTRAMUSCULAR at 23:30

## 2018-10-29 RX ADMIN — Medication 600 MILLIGRAM(S): at 07:07

## 2018-10-29 RX ADMIN — Medication 600 MILLIGRAM(S): at 14:33

## 2018-10-29 RX ADMIN — Medication 600 MILLIGRAM(S): at 22:02

## 2018-10-29 RX ADMIN — ZOLPIDEM TARTRATE 5 MILLIGRAM(S): 10 TABLET ORAL at 23:35

## 2018-10-29 RX ADMIN — Medication 600 MILLIGRAM(S): at 21:15

## 2018-10-29 NOTE — PROGRESS NOTE ADULT - SUBJECTIVE AND OBJECTIVE BOX
Subjective:   Patient doing well. No complaints. Minimal lochia. Pain controlled.    Objective:   T(F): 96.3 (10-29 @ 07:38), Max: 99.9 (10-28 @ 10:08)  HR: 73 (10-29 @ 07:38)  BP: 116/75 (10-29 @ 07:38) (116/75 - 131/77)  RR: 18 (10-29 @ 07:38)  SpO2: --  Gen: AAOx3, NAD  Abd: Soft, Nontender, Nondistended, Fundus firm below the umbilicus  Ext: no tendern, mild edema  Min Lochia Rubra    Labs:  CBC Full  -  ( 29 Oct 2018 09:09 )  WBC Count : 13.86 K/uL  Hemoglobin : 11.0 g/dL  Hematocrit : 34.4 %  Platelet Count - Automated : 208 K/uL  Mean Cell Volume : 81.9 fL  Mean Cell Hemoglobin : 26.2 pg  Mean Cell Hemoglobin Concentration : 32.0 g/dL  Auto Neutrophil # : 10.10 K/uL  Auto Lymphocyte # : 2.44 K/uL  Auto Monocyte # : 0.91 K/uL  Auto Eosinophil # : 0.22 K/uL  Auto Basophil # : 0.06 K/uL  Auto Neutrophil % : 72.9 %  Auto Lymphocyte % : 17.6 %  Auto Monocyte % : 6.6 %  Auto Eosinophil % : 1.6 %  Auto Basophil % : 0.4 %            Tolerating regular diet  Passed flatus, passed bowel movement  Breast/Bottle feeding    Assessment:   18y s/p , PPD#1, doing well    Plan:  -Routine postpartum care  -Encouraged ambulation and PO hydration  -Tolerating regular diet

## 2018-10-30 VITALS — SYSTOLIC BLOOD PRESSURE: 119 MMHG | DIASTOLIC BLOOD PRESSURE: 75 MMHG

## 2018-10-30 RX ORDER — IBUPROFEN 200 MG
1 TABLET ORAL
Qty: 0 | Refills: 0 | COMMUNITY
Start: 2018-10-30

## 2018-10-30 RX ORDER — AER TRAVELER 0.5 G/1
1 SOLUTION RECTAL; TOPICAL
Qty: 0 | Refills: 0 | COMMUNITY
Start: 2018-10-30

## 2018-10-30 RX ADMIN — Medication 600 MILLIGRAM(S): at 05:39

## 2018-10-30 RX ADMIN — Medication 600 MILLIGRAM(S): at 05:09

## 2018-10-30 NOTE — DISCHARGE NOTE ANTEPARTUM - HOSPITAL COURSE
DATE OF DISCHARGE: 10-30-18 @ 08:37    HISTORY OF PRESENT ILLNESS/HOSPITAL COURSE: HPI:  17y  at 40w by first trimester sono, here with contractions that started at 1500pm irregular but since 0000 they've been progressively increasing in intensity and frequency since 0000. They occur q 2-3min and are 8/10 in intensity. Patient reports leakage of fluid that soaked her undergarments once and it was clear. She denies vaginal bleeding and reports good fetal movement. She denies any complications in this pregnancy. GBS pos (28 Oct 2018 03:13)    PAST MEDICAL & SURGICAL HISTORY:  No pertinent past medical history  No significant past surgical history      PROCEDURES PERFORMED: vaginal delivery    COMPLICATIONS:  none    POST PARTUM COURSE: uncomplicated     FINAL DIAGNOSIS:  normal vaginal delivery    LABS:                       11.0   13.86 )-----------( 208      ( 29 Oct 2018 09:09 )             34.4

## 2018-10-30 NOTE — DISCHARGE NOTE ANTEPARTUM - CARE PLAN
Principal Discharge DX:	Vaginal delivery  Goal:	healthy recovery  Assessment and plan of treatment:	Nothing per vagina x 6 weeks; no sex/tampons/swimming/baths. Showers are OKAY.

## 2018-10-30 NOTE — DISCHARGE NOTE ANTEPARTUM - PATIENT PORTAL LINK FT
You can access the [email or web address], offered by [facility], by registering with the following website: [email or web address]

## 2018-10-30 NOTE — DISCHARGE NOTE ANTEPARTUM - MEDICATION SUMMARY - MEDICATIONS TO TAKE
I will START or STAY ON the medications listed below when I get home from the hospital:    ibuprofen 600 mg oral tablet  -- 1 tab(s) by mouth every 6 hours, As needed, Moderate Pain (4 - 6)  -- Indication: For pain    witch hazel 50% topical pad  -- 1 application on skin every 4 hours, As needed, Perineal Discomfort  -- Indication: For pain

## 2018-10-30 NOTE — DISCHARGE NOTE ANTEPARTUM - CARE PROVIDER_API CALL
Matthew Dyer), Obstetrics and Gynecology  [address] [place] [address]  Phone: [phone]  Fax: [phone]

## 2018-10-30 NOTE — PROGRESS NOTE ADULT - SUBJECTIVE AND OBJECTIVE BOX
OB attending  PPD #2    Pt doing well, pain well controlled. No overnight events, no acute complaints.    Ambulating: Yes  Voiding: Yes  Flatus: Yes  Bowel movements: Yes   Breast or bottle feeding: Breastfeeding  Diet: Regular    PAST MEDICAL & SURGICAL HISTORY:  No pertinent past medical history  No significant past surgical history      Physical Exam  Vital Signs Last 24 Hrs  T(C): 36.4 (30 Oct 2018 07:49), Max: 36.8 (29 Oct 2018 15:14)  T(F): 97.6 (30 Oct 2018 07:49), Max: 98.3 (29 Oct 2018 15:14)  HR: 77 (30 Oct 2018 07:49) (72 - 95)  BP: 133/64 (30 Oct 2018 07:49) (115/62 - 133/64)  BP(mean): --  RR: 20 (30 Oct 2018 07:49) (18 - 20)  SpO2: --  Gen: AAOx3, NAD  Abd: Soft, nontender, nondistended, BS+  Fundus: Firm, below umbilicus  Lochia: normal  Ext: No calf tenderness, no swelling    Labs:                        11.0   13.86 )-----------( 208      ( 29 Oct 2018 09:09 )             34.4         A/P: s/p , PPD #2, doing well  - d/c home

## 2018-11-05 DIAGNOSIS — Z34.80 ENCOUNTER FOR SUPERVISION OF OTHER NORMAL PREGNANCY, UNSPECIFIED TRIMESTER: ICD-10-CM

## 2018-11-05 DIAGNOSIS — Z28.21 IMMUNIZATION NOT CARRIED OUT BECAUSE OF PATIENT REFUSAL: ICD-10-CM

## 2018-11-05 DIAGNOSIS — Z3A.40 40 WEEKS GESTATION OF PREGNANCY: ICD-10-CM

## 2021-11-12 NOTE — OB PROVIDER TRIAGE NOTE - NS_SONOPERFORMEDBY_OBGYN_ALL_OB_FT
I have personally seen and examined this patient.  I have fully participated in the care of this patient. I have reviewed all pertinent clinical information, including history, physical exam, plan and the Resident’s note and agree except as noted.
Dr. Hairston

## 2024-02-29 NOTE — OB RN PATIENT PROFILE - STEPS TO INITIATE SKIN TO SKIN CONTACT DISCUSSED, INCLUDING INITIATING FATHER SKIN TO SKIN IF POSSIBLE.
-Noted on CT chest w/ contrast - small volume of pulmonary thromboembolus in the right middle lobe without finding of right heart strain/failure   -Hold off on anticoagulation for now in setting of brain mass and pending NSGY plans  -Monitor resp status  -ECHO pending   Statement Selected

## 2024-12-12 NOTE — OB RN TRIAGE NOTE - NSOBDISCHARGEVS_OBGYN_ALL_OB
Hello,  Patient is in a [facility] which requires a referral from [facility]    Please create a [facility] order/referral to support patients visit 12/29/24 to:  [facility]  [id number]  CPT CODES [id number] & [id number]  Diagnosis G47.30    Thank you     Confirmed that patient received an additional set of vital signs prior to discharge.